# Patient Record
(demographics unavailable — no encounter records)

---

## 2025-07-18 NOTE — ASSESSMENT
[FreeTextEntry1] : A 42-year-old male with a PMH of HTN, cervicalgia, cervical spinal stenosis, chronic back pain, and obesity who presented to the office for an initial visit. The patient was referred by his PCP, Dr. Barajas.  He reported experiencing pain in multiple parts of his body chronically.  He has been experiencing chronic neck pain and underwent cervical fusion of his cervical spine in 2022.  He continues to experience numbness and tingling in his right upper extremity.  He previously took gabapentin which provided limited relief of his symptoms. He also reported experiencing chronic pain in his low back and right knee.  The patient was previously evaluated by orthopedics for his knee pain and received corticosteroid and gel injections into his knees in 2023.  He denied noticing any significant improvement of his pain from receiving these treatments.  He also reported experiencing pain from light touch in his right knee and shin areas.  He was seen by his primary care physician who referred him to rheumatology for evaluation of fibromyalgia.  The patient was explained the concept of central sensitization where pain originating in one body part, such as the spine, can result in widespread pain over time. This process is characterized by amplified pain response (hyperalgesia) and pain in response to normally non-painful stimuli. (allodynia). The patient has a history of cervical spinal stenosis and patella baja in his right knee. He was explained the management of degenerative disease (i.e., osteoarthritis) which includes analgesic medications, physical therapy, corticosteroid injections, and surgical evaluation as needed.  The patient was offered the option to begin a neuropathic medication such as gabapentin or pregabalin (Lyrica) to potentially help his chronic pain. The patient was explained the potential adverse effects of pregabalin (Lyrica) which includes but is not limited to dizziness, drowsiness, blurred vision, double vision, suicidal ideation, delayed hypersensitivity reaction, peripheral edema, weight gain, and withdrawal symptoms. The patient agreed to start the medication after shared decision-making.  Plan: Start pregabalin (Lyrica) 50 mg daily at bedtime Consider referral to pain management is his symptoms persist Follow up with primary care; will request his recent lab results  Return to office in 4 weeks or sooner as needed.

## 2025-07-18 NOTE — DATA REVIEWED
[FreeTextEntry1] : MRI of cervical spine (8/15/23 at )  IMPRESSION:  No acute abnormality. Expected postop change with markedly improved canal diameter relative to preoperative MRI.  Unchanged small focus of myelomalacia in the cord and neuroforaminal narrowing similar to preoperative MRI.   MRI of right knee (6/27/23 at )  IMPRESSION:  Trace joint effusion. Tiny popliteal cyst. Patella baja, without additional abnormality in the extensor mechanism.   X-ray of bilateral knee (5/25/23 at )  IMPRESSION:  Bilateral knee radiographs are normal.

## 2025-07-18 NOTE — HISTORY OF PRESENT ILLNESS
[FreeTextEntry1] : The patient is a 42-year-old male with a PMH of HTN, cervicalgia, cervical spinal stenosis, chronic back pain, and obesity who presented to the office for an initial visit. The patient was referred by his PCP, Dr. Barajas.  He reported experiencing pain in multiple parts of his body chronically.  He has been experiencing chronic neck pain and underwent cervical fusion of his cervical spine in 2022.  He continues to experience numbness and tingling in his right upper extremity.  He previously took gabapentin which provided limited relief of his symptoms. He also reported experiencing chronic pain in his low back and right knee.  The patient was previously evaluated by orthopedics for his knee pain and received corticosteroid and gel injections into his knees in 2023.  He denied noticing any significant improvement of his pain from receiving these treatments.  He also reported experiencing pain from light touch in his right knee and shin areas.  He was seen by his primary care physician who referred him to rheumatology for evaluation of fibromyalgia.  Past Surgical History: C3-C7 ACDF (2022)